# Patient Record
Sex: FEMALE | Race: ASIAN | NOT HISPANIC OR LATINO | URBAN - METROPOLITAN AREA
[De-identification: names, ages, dates, MRNs, and addresses within clinical notes are randomized per-mention and may not be internally consistent; named-entity substitution may affect disease eponyms.]

---

## 2019-11-12 ENCOUNTER — EMERGENCY (EMERGENCY)
Facility: HOSPITAL | Age: 24
LOS: 1 days | Discharge: STILL ADMITTED OR ADMINISTRATIVE | End: 2019-11-12
Admitting: EMERGENCY MEDICINE

## 2019-11-12 ENCOUNTER — OUTPATIENT (OUTPATIENT)
Dept: INPATIENT UNIT | Facility: HOSPITAL | Age: 24
LOS: 1 days | Discharge: HOME | End: 2019-11-12
Payer: MEDICAID

## 2019-11-12 VITALS — SYSTOLIC BLOOD PRESSURE: 108 MMHG | HEART RATE: 101 BPM | DIASTOLIC BLOOD PRESSURE: 69 MMHG

## 2019-11-12 VITALS — DIASTOLIC BLOOD PRESSURE: 62 MMHG | SYSTOLIC BLOOD PRESSURE: 97 MMHG | HEART RATE: 82 BPM

## 2019-11-12 VITALS
RESPIRATION RATE: 16 BRPM | HEART RATE: 104 BPM | TEMPERATURE: 98 F | SYSTOLIC BLOOD PRESSURE: 108 MMHG | OXYGEN SATURATION: 100 % | DIASTOLIC BLOOD PRESSURE: 88 MMHG

## 2019-11-12 DIAGNOSIS — O26.899 OTHER SPECIFIED PREGNANCY RELATED CONDITIONS, UNSPECIFIED TRIMESTER: ICD-10-CM

## 2019-11-12 DIAGNOSIS — Z3A.00 WEEKS OF GESTATION OF PREGNANCY NOT SPECIFIED: ICD-10-CM

## 2019-11-12 DIAGNOSIS — K08.409 PARTIAL LOSS OF TEETH, UNSPECIFIED CAUSE, UNSPECIFIED CLASS: Chronic | ICD-10-CM

## 2019-11-12 DIAGNOSIS — Z98.890 OTHER SPECIFIED POSTPROCEDURAL STATES: Chronic | ICD-10-CM

## 2019-11-12 PROCEDURE — 99213 OFFICE O/P EST LOW 20 MIN: CPT

## 2019-11-12 NOTE — OB PROVIDER TRIAGE NOTE - NSPREVIOUSTERM_OBGYN_ALL_OB
recent trauma/falls, fever/chills, headache, dizziness, weakness, hemoptysis, melena, recent URI, cough, chest pain, palpitations, SOB, abdominal pain, N/V. No

## 2019-11-12 NOTE — OB PROVIDER TRIAGE NOTE - FINDINGS/TREATMENT
plan discussed with dr coe, dr banegas  early labor  no active labor  labor precuations reviewed with pt  continue fetal kick counts, rest and activity as tolerated, increase PO fluid  follow up with primary OB as scheduled   pt verbalized understanding of instructions given  discharged home

## 2019-11-12 NOTE — OB PROVIDER TRIAGE NOTE - NSOBPROVIDERNOTE_OBGYN_ALL_OB_FT
spt sve   1.5/60/-3    135 baseline, moderate variability, positive accels, no decels, irreg contractions    plan discussed with dr coe, dr banegas  early labor  no active labor  labor precuations reviewed with pt  continue fetal kick counts, rest and activity as tolerated, increase PO fluid  follow up with primary OB as scheduled   pt verbalized understanding of instructions given  discharged home

## 2019-11-12 NOTE — OB PROVIDER TRIAGE NOTE - NSHPLABSRESULTS_GEN_ALL_CORE
Vital Signs Last 24 Hrs  T(C): 36.4 (12 Nov 2019 06:10), Max: 36.5 (12 Nov 2019 05:42)  T(F): 97.6 (12 Nov 2019 06:10), Max: 97.7 (12 Nov 2019 05:42)  HR: 101 (12 Nov 2019 06:10) (101 - 104)  BP: 108/69 (12 Nov 2019 06:10) (108/69 - 108/88)  BP(mean): --  RR: 18 (12 Nov 2019 06:10) (16 - 18)  SpO2: 100% (12 Nov 2019 05:42) (100% - 100%)

## 2019-11-12 NOTE — OB PROVIDER TRIAGE NOTE - NSPRIMARYCAREPROV_OBGYN_ALL_OB
[facility] -   Obstetrics  History & Physical    Patient Name: Shruthi Riley  MRN: [id number]  Admission Date: 2018  Primary Care Provider: Primary Doctor No    Subjective:     Principal Problem:<principal problem not specified>    History of Present Illness:   IUP at 40 weeks    Obstetric HPI:  Patient reports None contractions, active fetal movement, No vaginal bleeding , No loss of fluid     This pregnancy has been complicated by hepatitis C positive,trichomonas, tobacco use,HX  birth    Obstetric History       T1      L2     SAB0   TAB0   Ectopic0   Multiple0   Live Births2       # Outcome Date GA Lbr Kris/2nd Weight Sex Delivery Anes PTL Lv   4 Current            3  01/01/15 26w0d   U    FD   2 Term 10/10/13 40w0d  1.814 kg (4 lb) M Vag-Spont EPI  JEN   1  02/21/10 32w0d  3.289 kg (7 lb 4 oz) M Vag-Spont EPI  JEN        Past Medical History:   Diagnosis Date    Hepatitis C     Miscarriage      Past Surgical History:   Procedure Laterality Date    DILATION AND CURETTAGE OF UTERUS      TONSILLECTOMY         PTA Medications   Medication Sig    prenatal 25/iron fum/folic/dha (PRENATAL-1 ORAL) Take by mouth once daily.       Review of patient's allergies indicates:  No Known Allergies     Family History     Problem Relation (Age of Onset)    Heart defect Mother        Tobacco Use    Smoking status: Current Every Day Smoker     Packs/day: 1.00     Types: Cigarettes    Smokeless tobacco: Never Used   Substance and Sexual Activity    Alcohol use: No    Drug use: Yes     Comment: polysubstance abuser     Sexual activity: Yes     Partners: Male     Review of Systems   Constitutional: Negative.    HENT: Negative.    Eyes: Negative.    Respiratory: Negative.    Cardiovascular: Negative.    Gastrointestinal: Negative.    Endocrine: Negative.    Genitourinary: Negative.    Musculoskeletal: Negative.    Neurological: Negative.    Hematological: Negative.     Psychiatric/Behavioral: Negative.    All other systems reviewed and are negative.  Breast: negative.       Objective:     Vital Signs (Most Recent):    Vital Signs (24h Range):  BP: (114)/(66) 114/66        There is no height or weight on file to calculate BMI.    FHT: 130Cat 2 (reassuring)  TOCO:  Q 30 minutes    Physical Exam:   Constitutional: She is oriented to person, place, and time. She appears well-developed and well-nourished.     Eyes: Conjunctivae are normal. Pupils are equal, round, and reactive to light.    Neck: Normal range of motion.    Cardiovascular: Normal rate and regular rhythm.     Pulmonary/Chest: Breath sounds normal.        Abdominal: Soft.     Genitourinary: Vagina normal and uterus normal.           Musculoskeletal: Normal range of motion and moves all extremeties.       Neurological: She is alert and oriented to person, place, and time.    Skin: Skin is warm.    Psychiatric: She has a normal mood and affect. Her behavior is normal. Thought content normal.       Cervix:  Dilation:  3.5  In office  Effacement:  50  Station: -3   Presentation:      Significant Labs:  Lab Results   Component Value Date    GROUPTRH O POS 2018    HEPBSAG Negative 2018    STREPBCULT No Group B Streptococcus isolated 2018       I have personallly reviewed all pertinent lab results from the last 24 hours.    Assessment/Plan:     25 y.o. female  at 40w3d for:    Post term pregnancy over 40 weeks    Admitted for elective induction of labor  Pitocin per protocol  Epidural if needed  Anticipate          Yancy Flores CNM  Obstetrics  [facility] -        MD//URIEL/ROLA

## 2019-11-12 NOTE — OB PROVIDER TRIAGE NOTE - NSHPPHYSICALEXAM_GEN_ALL_CORE
23 y/o [place] female, para 0010 @ 39+4 wks gestation, SIUP  Early labor  Gen: NAD; A+O x4  Abd. gravid, soft, NT  VS: 108/69, P 101, RR 20, T 36.4, Pain 6-7/10-pt declines any pain meds at this time.  Cat 1 tracin bpm, moderate variability, +accels, no decels  [place]: 2/10 min.  SVE: 1.5/60/-3, intact membranes  GBS negative  Clinical EFW 3000g
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
